# Patient Record
Sex: MALE | Race: BLACK OR AFRICAN AMERICAN | NOT HISPANIC OR LATINO | Employment: FULL TIME | URBAN - METROPOLITAN AREA
[De-identification: names, ages, dates, MRNs, and addresses within clinical notes are randomized per-mention and may not be internally consistent; named-entity substitution may affect disease eponyms.]

---

## 2018-10-09 ENCOUNTER — HOSPITAL ENCOUNTER (EMERGENCY)
Facility: HOSPITAL | Age: 33
Discharge: HOME | End: 2018-10-09
Attending: EMERGENCY MEDICINE | Admitting: EMERGENCY MEDICINE

## 2018-10-09 ENCOUNTER — APPOINTMENT (OUTPATIENT)
Dept: CT IMAGING | Facility: HOSPITAL | Age: 33
End: 2018-10-09

## 2018-10-09 VITALS
HEART RATE: 88 BPM | SYSTOLIC BLOOD PRESSURE: 120 MMHG | TEMPERATURE: 98.7 F | WEIGHT: 258 LBS | RESPIRATION RATE: 16 BRPM | OXYGEN SATURATION: 98 % | HEIGHT: 75 IN | DIASTOLIC BLOOD PRESSURE: 95 MMHG | BODY MASS INDEX: 32.08 KG/M2

## 2018-10-09 DIAGNOSIS — K63.89 EPIPLOIC APPENDAGITIS: Primary | ICD-10-CM

## 2018-10-09 LAB
ALBUMIN SERPL-MCNC: 4.5 G/DL (ref 3.5–5.2)
ALBUMIN/GLOB SERPL: 1.3 G/DL
ALP SERPL-CCNC: 82 U/L (ref 39–117)
ALT SERPL W P-5'-P-CCNC: 52 U/L (ref 1–41)
ANION GAP SERPL CALCULATED.3IONS-SCNC: 9.5 MMOL/L
AST SERPL-CCNC: 29 U/L (ref 1–40)
BASOPHILS # BLD AUTO: 0.01 10*3/MM3 (ref 0–0.2)
BASOPHILS NFR BLD AUTO: 0.2 % (ref 0–1.5)
BILIRUB SERPL-MCNC: 0.4 MG/DL (ref 0.1–1.2)
BILIRUB UR QL STRIP: NEGATIVE
BUN BLD-MCNC: 12 MG/DL (ref 6–20)
BUN/CREAT SERPL: 10.5 (ref 7–25)
CALCIUM SPEC-SCNC: 9.5 MG/DL (ref 8.6–10.5)
CHLORIDE SERPL-SCNC: 103 MMOL/L (ref 98–107)
CLARITY UR: CLEAR
CO2 SERPL-SCNC: 27.5 MMOL/L (ref 22–29)
COLOR UR: YELLOW
CREAT BLD-MCNC: 1.14 MG/DL (ref 0.76–1.27)
DEPRECATED RDW RBC AUTO: 44.6 FL (ref 37–54)
EOSINOPHIL # BLD AUTO: 0.08 10*3/MM3 (ref 0–0.7)
EOSINOPHIL NFR BLD AUTO: 1.4 % (ref 0.3–6.2)
ERYTHROCYTE [DISTWIDTH] IN BLOOD BY AUTOMATED COUNT: 14.4 % (ref 11.5–14.5)
GFR SERPL CREATININE-BSD FRML MDRD: 89 ML/MIN/1.73
GLOBULIN UR ELPH-MCNC: 3.6 GM/DL
GLUCOSE BLD-MCNC: 101 MG/DL (ref 65–99)
GLUCOSE UR STRIP-MCNC: NEGATIVE MG/DL
HCT VFR BLD AUTO: 46.2 % (ref 40.4–52.2)
HGB BLD-MCNC: 15.2 G/DL (ref 13.7–17.6)
HGB UR QL STRIP.AUTO: NEGATIVE
IMM GRANULOCYTES # BLD: 0.02 10*3/MM3 (ref 0–0.03)
IMM GRANULOCYTES NFR BLD: 0.3 % (ref 0–0.5)
KETONES UR QL STRIP: NEGATIVE
LEUKOCYTE ESTERASE UR QL STRIP.AUTO: NEGATIVE
LIPASE SERPL-CCNC: 43 U/L (ref 13–60)
LYMPHOCYTES # BLD AUTO: 2.76 10*3/MM3 (ref 0.9–4.8)
LYMPHOCYTES NFR BLD AUTO: 46.6 % (ref 19.6–45.3)
MCH RBC QN AUTO: 28.1 PG (ref 27–32.7)
MCHC RBC AUTO-ENTMCNC: 32.9 G/DL (ref 32.6–36.4)
MCV RBC AUTO: 85.6 FL (ref 79.8–96.2)
MONOCYTES # BLD AUTO: 0.5 10*3/MM3 (ref 0.2–1.2)
MONOCYTES NFR BLD AUTO: 8.4 % (ref 5–12)
NEUTROPHILS # BLD AUTO: 2.57 10*3/MM3 (ref 1.9–8.1)
NEUTROPHILS NFR BLD AUTO: 43.4 % (ref 42.7–76)
NITRITE UR QL STRIP: NEGATIVE
PH UR STRIP.AUTO: 6 [PH] (ref 5–8)
PLATELET # BLD AUTO: 276 10*3/MM3 (ref 140–500)
PMV BLD AUTO: 11.4 FL (ref 6–12)
POTASSIUM BLD-SCNC: 4.6 MMOL/L (ref 3.5–5.2)
PROT SERPL-MCNC: 8.1 G/DL (ref 6–8.5)
PROT UR QL STRIP: NEGATIVE
RBC # BLD AUTO: 5.4 10*6/MM3 (ref 4.6–6)
SODIUM BLD-SCNC: 140 MMOL/L (ref 136–145)
SP GR UR STRIP: 1.02 (ref 1–1.03)
UROBILINOGEN UR QL STRIP: NORMAL
WBC NRBC COR # BLD: 5.92 10*3/MM3 (ref 4.5–10.7)

## 2018-10-09 PROCEDURE — 25010000002 IOPAMIDOL 61 % SOLUTION: Performed by: EMERGENCY MEDICINE

## 2018-10-09 PROCEDURE — 85025 COMPLETE CBC W/AUTO DIFF WBC: CPT | Performed by: NURSE PRACTITIONER

## 2018-10-09 PROCEDURE — 80053 COMPREHEN METABOLIC PANEL: CPT | Performed by: NURSE PRACTITIONER

## 2018-10-09 PROCEDURE — 81003 URINALYSIS AUTO W/O SCOPE: CPT | Performed by: NURSE PRACTITIONER

## 2018-10-09 PROCEDURE — 99284 EMERGENCY DEPT VISIT MOD MDM: CPT

## 2018-10-09 PROCEDURE — 83690 ASSAY OF LIPASE: CPT | Performed by: NURSE PRACTITIONER

## 2018-10-09 PROCEDURE — 74177 CT ABD & PELVIS W/CONTRAST: CPT

## 2018-10-09 RX ORDER — NAPROXEN 500 MG/1
500 TABLET ORAL 2 TIMES DAILY PRN
Qty: 14 TABLET | Refills: 0 | Status: SHIPPED | OUTPATIENT
Start: 2018-10-09 | End: 2021-01-21

## 2018-10-09 RX ADMIN — IOPAMIDOL 85 ML: 612 INJECTION, SOLUTION INTRAVENOUS at 12:45

## 2018-10-09 NOTE — ED PROVIDER NOTES
EMERGENCY DEPARTMENT ENCOUNTER    CHIEF COMPLAINT  Chief Complaint: abdominal pain  History given by: patient, family  History limited by: N/A  Time Seen: 1147  Room Number: 35/35  PMD: Provider, No Known      HPI:  Pt is a 33 y.o. male who presents with intermittent sharp LLQ abd pain that started about 3 days ago. It is exacerbated by movement and jumping and is alleviated by remaining still and drinking water. He denies N/V/D, pain and difficulty with urination, left testicular pain, decreased appetite, acute change in chronic back pain, fevers, and chills. Pt has no other complaints at this time.     Duration: started about 3 days ago  Timing: intermittent  Location: LLQ abdomen  Radiation: none  Quality: sharp  Intensity/Severity: moderate  Progression: unchanged  Associated Symptoms: none  Aggravating Factors: movement, jumping  Alleviating Factors: remaining still, drinking water  Previous Episodes: none  Treatment before arrival: none mentioned    PAST MEDICAL HISTORY  Active Ambulatory Problems     Diagnosis Date Noted   • No Active Ambulatory Problems     Resolved Ambulatory Problems     Diagnosis Date Noted   • No Resolved Ambulatory Problems     No Additional Past Medical History       PAST SURGICAL HISTORY  History reviewed. No pertinent surgical history.    FAMILY HISTORY  History reviewed. No pertinent family history.    SOCIAL HISTORY  Social History     Social History   • Marital status:      Spouse name: N/A   • Number of children: N/A   • Years of education: N/A     Occupational History   • Not on file.     Social History Main Topics   • Smoking status: Never Smoker   • Smokeless tobacco: Not on file   • Alcohol use No   • Drug use: No   • Sexual activity: Not on file     Other Topics Concern   • Not on file     Social History Narrative   • No narrative on file         ALLERGIES  Patient has no known allergies.    REVIEW OF SYSTEMS  Review of Systems   Constitutional: Negative for appetite  change, chills and fever.   HENT: Negative for congestion, rhinorrhea and sore throat.    Eyes: Negative for pain.   Respiratory: Negative for cough and shortness of breath.    Cardiovascular: Negative for chest pain and palpitations.   Gastrointestinal: Positive for abdominal pain (LLQ abd pain). Negative for diarrhea, nausea and vomiting.   Endocrine: Negative.    Genitourinary: Negative for difficulty urinating, dysuria and testicular pain.   Musculoskeletal: Negative for myalgias. Back pain: chronic, no acute change.   Skin: Negative.    Neurological: Negative for speech difficulty, weakness, numbness and headaches.   Psychiatric/Behavioral: Negative.    All other systems reviewed and are negative.      PHYSICAL EXAM  ED Triage Vitals   Temp Heart Rate Resp BP SpO2   10/09/18 1139 10/09/18 1139 10/09/18 1139 10/09/18 1153 10/09/18 1139   98.7 °F (37.1 °C) 100 18 135/86 99 % WNL      Temp src Heart Rate Source Patient Position BP Location FiO2 (%)   10/09/18 1139 10/09/18 1153 10/09/18 1226 10/09/18 1226 --   Tympanic Monitor Lying Right arm        Physical Exam   Constitutional: He is oriented to person, place, and time. No distress.   HENT:   Head: Normocephalic.   Mouth/Throat: Mucous membranes are normal.   Eyes: Pupils are equal, round, and reactive to light. EOM are normal.   Neck: Normal range of motion. Neck supple.   Cardiovascular: Normal rate, regular rhythm and normal heart sounds.    Pulmonary/Chest: Effort normal and breath sounds normal. No respiratory distress. He has no decreased breath sounds. He has no wheezes. He has no rhonchi. He has no rales.   Abdominal: Soft. There is tenderness in the left lower quadrant. There is no rebound, no guarding and no CVA tenderness.   Musculoskeletal: Normal range of motion.   Neurological: He is alert and oriented to person, place, and time. He has normal motor skills and normal sensation.   Skin: Skin is warm and dry.   Psychiatric: Mood and affect normal.    Nursing note and vitals reviewed.      LAB RESULTS  Recent Results (from the past 24 hour(s))   Comprehensive Metabolic Panel    Collection Time: 10/09/18 12:00 PM   Result Value Ref Range    Glucose 101 (H) 65 - 99 mg/dL    BUN 12 6 - 20 mg/dL    Creatinine 1.14 0.76 - 1.27 mg/dL    Sodium 140 136 - 145 mmol/L    Potassium 4.6 3.5 - 5.2 mmol/L    Chloride 103 98 - 107 mmol/L    CO2 27.5 22.0 - 29.0 mmol/L    Calcium 9.5 8.6 - 10.5 mg/dL    Total Protein 8.1 6.0 - 8.5 g/dL    Albumin 4.50 3.50 - 5.20 g/dL    ALT (SGPT) 52 (H) 1 - 41 U/L    AST (SGOT) 29 1 - 40 U/L    Alkaline Phosphatase 82 39 - 117 U/L    Total Bilirubin 0.4 0.1 - 1.2 mg/dL    eGFR  African Amer 89 >60 mL/min/1.73    Globulin 3.6 gm/dL    A/G Ratio 1.3 g/dL    BUN/Creatinine Ratio 10.5 7.0 - 25.0    Anion Gap 9.5 mmol/L   Lipase    Collection Time: 10/09/18 12:00 PM   Result Value Ref Range    Lipase 43 13 - 60 U/L   CBC Auto Differential    Collection Time: 10/09/18 12:00 PM   Result Value Ref Range    WBC 5.92 4.50 - 10.70 10*3/mm3    RBC 5.40 4.60 - 6.00 10*6/mm3    Hemoglobin 15.2 13.7 - 17.6 g/dL    Hematocrit 46.2 40.4 - 52.2 %    MCV 85.6 79.8 - 96.2 fL    MCH 28.1 27.0 - 32.7 pg    MCHC 32.9 32.6 - 36.4 g/dL    RDW 14.4 11.5 - 14.5 %    RDW-SD 44.6 37.0 - 54.0 fl    MPV 11.4 6.0 - 12.0 fL    Platelets 276 140 - 500 10*3/mm3    Neutrophil % 43.4 42.7 - 76.0 %    Lymphocyte % 46.6 (H) 19.6 - 45.3 %    Monocyte % 8.4 5.0 - 12.0 %    Eosinophil % 1.4 0.3 - 6.2 %    Basophil % 0.2 0.0 - 1.5 %    Immature Grans % 0.3 0.0 - 0.5 %    Neutrophils, Absolute 2.57 1.90 - 8.10 10*3/mm3    Lymphocytes, Absolute 2.76 0.90 - 4.80 10*3/mm3    Monocytes, Absolute 0.50 0.20 - 1.20 10*3/mm3    Eosinophils, Absolute 0.08 0.00 - 0.70 10*3/mm3    Basophils, Absolute 0.01 0.00 - 0.20 10*3/mm3    Immature Grans, Absolute 0.02 0.00 - 0.03 10*3/mm3   Urinalysis With Microscopic If Indicated (No Culture) - Urine, Clean Catch    Collection Time: 10/09/18 12:35 PM    Result Value Ref Range    Color, UA Yellow Yellow, Straw    Appearance, UA Clear Clear    pH, UA 6.0 5.0 - 8.0    Specific Gravity, UA 1.022 1.005 - 1.030    Glucose, UA Negative Negative    Ketones, UA Negative Negative    Bilirubin, UA Negative Negative    Blood, UA Negative Negative    Protein, UA Negative Negative    Leuk Esterase, UA Negative Negative    Nitrite, UA Negative Negative    Urobilinogen, UA 0.2 E.U./dL 0.2 - 1.0 E.U./dL       I ordered the above labs and reviewed the results    RADIOLOGY  CT Abdomen Pelvis With Contrast (Preliminary result)   Result time 10/09/18 13:20:18   Preliminary result by Kelby Carrion MD (10/09/18 13:20:18)                Impression:    CT findings suggestive of epiploic appendagitis adjacent to  the distal left colon. Small pelvic ascites.     These findings were discussed with Kalie Dixon by telephone.     Radiation dose reduction techniques were utilized, including automated  exposure control and exposure modulation based on body size.                  Narrative:    CT ABDOMEN AND PELVIS WITH CONTRAST     HISTORY: Left lower quadrant pain.     TECHNIQUE: Axial CT images of the abdomen and pelvis were obtained  following administration of intravenous contrast. The patient was not  given oral contrast. Coronal and sagittal reformats were obtained.     COMPARISON: None.     FINDINGS:      There is an ovoid area of predominantly fat density with hyperdense soft  tissue rim seen within the left lower quadrant anterior to the left  colon, image 89. These findings are consistent with acute epiploic  appendagitis. A small amount of layering fluid is seen within the  pelvis, likely reactive.     The liver demonstrates normal attenuation. No focal hepatic lesions or  intrahepatic biliary dilatation. The gallbladder, spleen and the  pancreas are normal. Bilateral adrenal gland and kidneys are normal. The  urinary bladder is partially distended with circumferential  "wall  thickening. No pathological retroperitoneal lymphadenopathy is seen. The  appendix is normal. A sclerotic lesion is seen within the L2 vertebral  body that may represent a bone island.                     I ordered the above noted radiological studies and reviewed the images on the PACS system.  Spoke with Dr. Carrion (radiologist) regarding CT abd/pel scan results        PROGRESS AND CONSULTS  1151- Ordered CT abd/pel, blood work, lipase, and UA for further evaluation.     1209- Reviewed pt's history and workup with Dr. Braun. After a bedside evaluation; Dr Braun agrees with the plan of care.    1258- Obtained CT abd/pel results-> epiploic appendagitis.     1332- Rechecked pt. He is resting comfortably and is in no acute distress. Discussed with pt and family about all pertinent results including normal WBC count, unremarkable UA, otherwise stable labs, and CT abd/pel findings (epiploic appendagitis). Informed pt of plan to prescribe NSAID for sx. Instructed to f/u closely with referred [facility] physician for recheck. RTER warnings given. Pt understands and agrees with plan. Addressed all questions.          COURSE & MEDICAL DECISION MAKING  Pertinent Labs and Imaging studies that were ordered and reviewed are noted above.  Results were reviewed/discussed with the patient and they were also made aware of online assess.   Pt also made aware that some labs, such as cultures, will not be resulted during ER visit and follow up with PMD is necessary.     MEDICATIONS GIVEN IN ER  Medications   iopamidol (ISOVUE-300) 61 % injection 100 mL (85 mL Intravenous Given 10/9/18 1245)       /95 (BP Location: Right arm, Patient Position: Lying)   Pulse 88   Temp 98.7 °F (37.1 °C) (Tympanic)   Resp 16   Ht 190.5 cm (75\")   Wt 117 kg (258 lb)   SpO2 98%   BMI 32.25 kg/m²     Discussed all results and noted any abnormalities with patient.  Discussed absoute need to recheck abnormalities with referred [facility] physician. "     Reviewed implications of results, diagnosis, meds, responsibility to follow up, warning signs and symptoms of possible worsening, potential complications and reasons to return to ER with patient    Discussed plan for discharge, as there is no emergent indication for admission.  Pt is agreeable and understands need for follow up and repeat testing.  Pt is aware that discharge does not mean that nothing is wrong but it indicates no emergency is present and they must continue care with referred [facility] physician.  Pt is discharged with instructions to follow up with primary care doctor to have their blood pressure rechecked.       DIAGNOSIS  Final diagnoses:   Epiploic appendagitis       FOLLOW UP   PATIENT LIAISON [facility] [address]  [phone]  Schedule an appointment as soon as possible for a visit         RX     Medication List      New Prescriptions    naproxen 500 MG tablet  Commonly known as:  NAPROSYN  Take 1 tablet by mouth 2 (Two) Times a Day As Needed for Moderate Pain .            I personally reviewed the past medical history, past surgical history, social history, family history, current medications and allergies as they appear in this chart.  The scribe's note accurately reflects the work and decisions made by me.         Documentation assistance provided by pauline Echevarria for SUSAN Alvarez.  Information recorded by the pauline was done at my direction and has been verified and validated by me.       Henry Echevarria  10/09/18 1346       Kalie Dixon APRN  10/09/18 8897

## 2018-10-09 NOTE — ED PROVIDER NOTES
"Pt presents to the ED c/o intermittent sharp LLQ abd pain that began 3 days ago. Pt was playing with his daughter when pain began. Pt denies feeling a \"pull\" in his abd but states it \"feels like there is something inside there\". Pt denies radiation of pain to testicles. Pt states pain is aggravated with movement.     On exam,   A&Ox3. NAD, PERRL, moist mucous membranes. Heart is RRR, lungs are CTAB. Abd is soft, there is L mid abd tenderness, non distended, bowel sounds are diminished. No pedal edema.  Normocephalic, atraumatic. Normal neuro exam       Attestation:  The SHARIF and I have discussed this patient's history, physical exam, and treatment plan.  I have reviewed the documentation and personally had a face to face interaction with the patient. I affirm the documentation and agree with the treatment and plan.  The attached note describes my personal findings.      Documentation assistance provided by pauline Schultz for Dr. Braun. Information recorded by the scribe was done at my direction and has been verified and validated by me.     Sugey Schultz  10/09/18 1238       Jean Pierre Braun MD  10/09/18 2731    "

## 2021-01-21 ENCOUNTER — HOSPITAL ENCOUNTER (EMERGENCY)
Facility: HOSPITAL | Age: 36
Discharge: HOME | End: 2021-01-21
Attending: EMERGENCY MEDICINE | Admitting: EMERGENCY MEDICINE

## 2021-01-21 VITALS
OXYGEN SATURATION: 96 % | HEIGHT: 75 IN | WEIGHT: 248 LBS | HEART RATE: 83 BPM | SYSTOLIC BLOOD PRESSURE: 143 MMHG | BODY MASS INDEX: 30.84 KG/M2 | DIASTOLIC BLOOD PRESSURE: 112 MMHG | TEMPERATURE: 97.6 F | RESPIRATION RATE: 18 BRPM

## 2021-01-21 DIAGNOSIS — K04.7 DENTAL ABSCESS: ICD-10-CM

## 2021-01-21 DIAGNOSIS — K02.9 DENTAL CARIES: Primary | ICD-10-CM

## 2021-01-21 PROCEDURE — 99283 EMERGENCY DEPT VISIT LOW MDM: CPT

## 2021-01-21 RX ORDER — IBUPROFEN 800 MG/1
800 TABLET ORAL ONCE
Status: COMPLETED | OUTPATIENT
Start: 2021-01-21 | End: 2021-01-21

## 2021-01-21 RX ORDER — IBUPROFEN 800 MG/1
800 TABLET ORAL EVERY 4 HOURS
COMMUNITY
End: 2021-01-21

## 2021-01-21 RX ORDER — PENICILLIN V POTASSIUM 500 MG/1
500 TABLET ORAL 4 TIMES DAILY
Qty: 40 TABLET | Refills: 0 | Status: SHIPPED | OUTPATIENT
Start: 2021-01-21

## 2021-01-21 RX ORDER — DICLOFENAC SODIUM 75 MG/1
75 TABLET, DELAYED RELEASE ORAL 2 TIMES DAILY PRN
Qty: 20 TABLET | Refills: 0 | Status: SHIPPED | OUTPATIENT
Start: 2021-01-21

## 2021-01-21 RX ADMIN — IBUPROFEN 800 MG: 800 TABLET ORAL at 14:32

## 2021-01-21 NOTE — ED NOTES
Pt complains of left sided dental pain and a lump on R side of scalp.  Pt states he thinks he has a dental infection but can not get into Dentist until next month.  Mask placed on patient in triage.  Triage RN wearing mask throughout encounter.       Alan Kong, RN  01/21/21 [id number]

## 2021-01-21 NOTE — ED PROVIDER NOTES
EMERGENCY DEPARTMENT ENCOUNTER    Room Number:  43/43  Date of encounter:  1/21/2021  PCP: Provider, No Known  Historian: Patient      I used full protective equipment while examining this patient.  This includes face mask, gloves and protective eyewear.  I washed my hands before entering the room and immediately upon leaving the room      HPI:  Chief Complaint: Dental pain, swelling  A complete HPI/ROS/PMH/PSH/SH/FH are unobtainable due to: Nothing    Context: Basil Kearney is a 35 y.o. male who presents to the ED c/o 1 to 2-month history of worsening, constant, severe left lower jaw pain and swelling.  Patient denies any fevers, chills, trouble swallowing.  Patient has been unable to get into the dentist.  Patient has no underlying comorbidities.  Patient states that breathing and eating worsen his pain.  He describes the pain is sharp and severe.  There is no radiation of pain.    Review of Medical Records  No previous pertinent medical records.    PAST MEDICAL HISTORY  Active Ambulatory Problems     Diagnosis Date Noted   • No Active Ambulatory Problems     Resolved Ambulatory Problems     Diagnosis Date Noted   • No Resolved Ambulatory Problems     No Additional Past Medical History         PAST SURGICAL HISTORY  History reviewed. No pertinent surgical history.      FAMILY HISTORY  History reviewed. No pertinent family history.      SOCIAL HISTORY  Social History     Socioeconomic History   • Marital status:      Spouse name: Not on file   • Number of children: Not on file   • Years of education: Not on file   • Highest education level: Not on file   Tobacco Use   • Smoking status: Never Smoker   Substance and Sexual Activity   • Alcohol use: No   • Drug use: No         ALLERGIES  Patient has no known allergies.        REVIEW OF SYSTEMS  All systems reviewed and negative except for those discussed in HPI.       PHYSICAL EXAM    I have reviewed the triage vital signs and nursing notes.    ED Triage Vitals    Temp Heart Rate Resp BP SpO2   01/21/21 1212 01/21/21 1212 01/21/21 1212 01/21/21 1354 01/21/21 1212   97.6 °F (36.4 °C) 83 18 (!) 143/112 96 %      Temp src Heart Rate Source Patient Position BP Location FiO2 (%)   01/21/21 1212 -- 01/21/21 1354 01/21/21 1354 --   Tympanic  Lying Left arm        Physical Exam  GENERAL: Alert, oriented, not distressed  HENT: Tooth #19 is fractured with obvious jovita.  Mild surrounding swelling.  Soft submandibular tissues.  EYES: no scleral icterus, EOMI  CV: regular rhythm, regular rate, no murmur  RESPIRATORY: normal effort, CTA  ABDOMEN: soft, nontender  MUSCULOSKELETAL: no deformity, FROM, no calf swelling or tenderness  NEURO: alert, moves all extremities, follows commands  SKIN: warm, dry      PROGRESS, DATA ANALYSIS, CONSULTS, AND MEDICAL DECISION MAKING    All labs have been independently reviewed by me.  All radiology studies have been reviewed by me and discussed with radiologist dictating the report.   EKG's independently viewed and interpreted by me.  Discussion below represents my analysis of pertinent findings related to patient's condition, differential diagnosis, treatment plan and final disposition.    I have discussed case with Dr. Baltazar, emergency room physician.  He has performed his own bedside examination and agrees with treatment plan.    ED Course as of Jan 21 2028   Thu Jan 21, 2021   1354 Patient presents with left lower jaw pain and swelling.  Patient has a dental jovita with surrounding erythema, likely developing abscess.  No evidence of German's angina.  Plan to treat with antibiotics and dentist follow-up.    [EE]      ED Course User Index  [EE] Brian Morrison PA       AS OF 20:28 EST VITALS:    BP - (!) 143/112  HR - 83  TEMP - 97.6 °F (36.4 °C) (Tympanic)  O2 SATS - 96%        DIAGNOSIS  Final diagnoses:   Dental caries   Dental abscess         DISPOSITION  Discharged           Brian Morrison PA  01/21/21 2028

## 2021-01-21 NOTE — ED PROVIDER NOTES
Pt presents to the ED c/o  2 months of tooth pain.  He does have a broken tooth and he has been trying to see a dentist but has had difficulty getting an appointment and he has no dental insurance.  He denies fever or chills.     On exam,   The right mandibular canine is broken with no gingival swelling, no drainage.  There is no sublingual edema.  No posterior pharyngeal edema.     Plan: Home antibiotics and dental follow-up for likely tooth extraction.  Return precautions were discussed.      I wore a surgical mask, gloves, and eye protection during this patient encounter.  Patient also wearing a surgical mask.  Hand hygeine performed before and after seeing the patient.     Attestation:  The SHARIF and I have discussed this patient's history, physical exam, and treatment plan.  I have reviewed the documentation and personally had a face to face interaction with the patient. I affirm the documentation and agree with the treatment and plan.  The attached note describes my personal findings.            Mango Baltazar MD  01/21/21 2540